# Patient Record
Sex: FEMALE | Race: WHITE
[De-identification: names, ages, dates, MRNs, and addresses within clinical notes are randomized per-mention and may not be internally consistent; named-entity substitution may affect disease eponyms.]

---

## 2018-08-23 ENCOUNTER — HOSPITAL ENCOUNTER (EMERGENCY)
Dept: HOSPITAL 80 - FED | Age: 23
Discharge: HOME | End: 2018-08-23
Payer: COMMERCIAL

## 2018-08-23 VITALS — DIASTOLIC BLOOD PRESSURE: 75 MMHG | SYSTOLIC BLOOD PRESSURE: 119 MMHG

## 2018-08-23 DIAGNOSIS — I80.8: Primary | ICD-10-CM

## 2018-08-23 DIAGNOSIS — L03.113: ICD-10-CM

## 2018-08-23 NOTE — EDPHY
H & P


Time Seen by Provider: 08/23/18 18:41


HPI/ROS: 





CHIEF COMPLAINT:  Swelling right arm





HISTORY OF PRESENT ILLNESS:  22-year-old female presents to the emergency 

department with concerns of swelling to her right arm.  The patient admits to 

being an IV heroin abuser.  She last injected in her right forearm yesterday 

afternoon and states that she missed the vein.  She noted some swelling which 

got much worse last night and she thinks that is actually improving a bit 

today.  She has no associated pain with this.  She denies paresthesias in her 

upper extremities.  Denies feelings of weakness in her upper extremities.  

Denies pain in her right wrist, right elbow or right shoulder.  Denies pain in 

her right axilla.





REVIEW OF SYSTEMS:


Constitutional:  No fever, no chills.


Eyes:  No double or blurry vision.


ENT:  No sore throat.


Respiratory:  No cough, no shortness of breath.


Cardiac:  No chest pain.


Gastrointestinal:  No abdominal pain, vomiting or diarrhea.


Genitourinary:  No dysuria.


Musculoskeletal:  No neck or back pain.


Skin:  No rashes.


Neurological:  No headache.


Past Medical/Surgical History: 





Substance abuse


Social History: 





Single and lives in [place] with her mom


Smoking Status: Never smoked


Physical Exam: 





General Appearance:  Alert, no distress.


Eyes:  Pupils equal and round.  Extraocular motions are all intact.


ENT:  Mouth:  Mucous membranes moist.


Respiratory:  No wheezing, rhonchi, or rales, lungs are clear to auscultation.


Cardiovascular:  Regular rate and rhythm.


Gastrointestinal:  Abdomen is soft and nontender, no masses, no rebound or 

guarding, bowel sounds normal.


Neurological:  Alert and oriented x 3, cranial nerves II through XII grossly 

intact


Skin:  Warm and dry, no rashes.


Musculoskeletal:  Nontender to palpate along the cervical, thoracic or lumbar 

spine.  Neck is supple.


Extremities:  Obvious swelling noted to the right forearm from the wrist up to 

the elbow.  It is warm to the touch.  She has some mild erythema.  She has no 

pain with palpation.  No palpable fluctuance or evidence of abscess.  There is 

no lymphangitis.  No palpable bony tenderness.  Full range of motion of the 

right upper extremity.  She has diffuse areas of ecchymosis noted to bilateral 

forearms from previous injections.


Psychiatric:  Patient is oriented X 3, there is no agitation.


Constitutional: 


 Initial Vital Signs











Temperature (C)  36.6 C   08/23/18 18:11


 


Heart Rate  126 H  08/23/18 18:11


 


Respiratory Rate  16   08/23/18 18:11


 


Blood Pressure  125/84 H  08/23/18 18:11


 


O2 Sat (%)  98   08/23/18 18:11








 











O2 Delivery Mode               Room Air














Allergies/Adverse Reactions: 


 





amoxicillin [Amoxicillin] Allergy (Mild, Verified 08/23/18 18:11)


 Rash


Penicillins Allergy (Mild, Verified 08/23/18 18:11)


 Rash








Home Medications: 














 Medication  Instructions  Recorded


 


Cephalexin [Keflex] 500 mg PO QID #28 cap 08/23/18


 


Sulfamethox/Tmp 800/160 mg 1 tab PO BID #14 tab 08/23/18





[Bactrim DS]  














Medical Decision Making





- Diagnostics


Imaging Results: 


 Imaging Impressions





Extremity Venous Study  08/23/18 18:55


Impression:  


1. No evidence of deep venous thrombosis.


2. Occlusion of the cephalic vein with subcutaneous edema and pain in the 

forearm compatible with superficial thrombophlebitis.


3. No evidence of loculated fluid collection to suggest abscess.


 


Dr. Hodges was notified of these findings by telephone at 8:05 PM on 8/23/2018.











Imaging: Discussed imaging studies w/ On call Radiologist


ED Course/Re-evaluation: 





22-year-old female presents emergency department with swelling to the right 

forearm.  There is no evidence of compartment syndrome.  She does have obvious 

swelling.  I expose concerned about possible DVT also concerned about possible 

abscess.  Ultrasound reveals no evidence of DVT or abscess.  Clinically she 

could have an early cellulitis.  She will be treated with Keflex and Bactrim.  

I encouraged warm compresses.





There is no evidence of compartment syndrome.  The patient has no associated 

pain with this.  I did encourage her to return to the emergency department if 

she had any change in symptoms or felt worse.


Differential Diagnosis: 





Including but not limited to DVT, cellulitis, compartment syndrome, abscess, 

necrotizing fasciitis





Departure





- Departure


Disposition: Home, Routine, Self-Care


Clinical Impression: 


 Superficial thrombophlebitis , Cellulitis of right forearm





Condition: Good


Instructions:  Cellulitis (ED), Superficial Thrombophlebitis (ED)


Additional Instructions: 


Keflex and Bactrim as directed for 1 week.  Warm compresses as discussed.  

Return to the emergency department if you developed fever, increasing pain or 

swelling in your forearm, increasing pain, or if you feel worse in any way.


Referrals: 


Chanel Gustafson NP [Primary Care Provider] - As per Instructions


Prescriptions: 


Cephalexin [Keflex] 500 mg PO QID #28 cap


Sulfamethox/Tmp 800/160 mg [Bactrim DS] 1 tab PO BID #14 tab